# Patient Record
Sex: MALE | Race: BLACK OR AFRICAN AMERICAN | Employment: PART TIME | URBAN - METROPOLITAN AREA
[De-identification: names, ages, dates, MRNs, and addresses within clinical notes are randomized per-mention and may not be internally consistent; named-entity substitution may affect disease eponyms.]

---

## 2022-05-05 ENCOUNTER — HOSPITAL ENCOUNTER (EMERGENCY)
Age: 22
Discharge: HOME | End: 2022-05-05
Payer: MEDICAID

## 2022-05-05 VITALS
WEIGHT: 190 LBS | TEMPERATURE: 97.7 F | BODY MASS INDEX: 28.14 KG/M2 | RESPIRATION RATE: 12 BRPM | SYSTOLIC BLOOD PRESSURE: 123 MMHG | HEART RATE: 64 BPM | OXYGEN SATURATION: 99 % | DIASTOLIC BLOOD PRESSURE: 64 MMHG | HEIGHT: 69 IN

## 2022-05-05 DIAGNOSIS — Z20.2 POSSIBLE EXPOSURE TO STD: Primary | ICD-10-CM

## 2022-05-05 LAB
BILIRUBIN URINE: NEGATIVE
BLOOD, URINE: NEGATIVE
CLARITY: CLEAR
COLOR: YELLOW
GLUCOSE URINE: NEGATIVE MG/DL
KETONES, URINE: NEGATIVE MG/DL
LEUKOCYTE ESTERASE, URINE: NEGATIVE
MICROSCOPIC EXAMINATION: NORMAL
NITRITE, URINE: NEGATIVE
PH UA: 6 (ref 5–8)
PROTEIN UA: NEGATIVE MG/DL
SPECIFIC GRAVITY UA: >=1.03 (ref 1–1.03)
URINE REFLEX TO CULTURE: NORMAL
URINE TYPE: NORMAL
UROBILINOGEN, URINE: 0.2 E.U./DL

## 2022-05-05 PROCEDURE — 99283 EMERGENCY DEPT VISIT LOW MDM: CPT

## 2022-05-05 PROCEDURE — 87491 CHLMYD TRACH DNA AMP PROBE: CPT

## 2022-05-05 PROCEDURE — 6370000000 HC RX 637 (ALT 250 FOR IP): Performed by: NURSE PRACTITIONER

## 2022-05-05 PROCEDURE — 81003 URINALYSIS AUTO W/O SCOPE: CPT

## 2022-05-05 PROCEDURE — 87591 N.GONORRHOEAE DNA AMP PROB: CPT

## 2022-05-05 RX ORDER — METRONIDAZOLE 250 MG/1
2000 TABLET ORAL ONCE
Status: COMPLETED | OUTPATIENT
Start: 2022-05-05 | End: 2022-05-05

## 2022-05-05 RX ORDER — AZITHROMYCIN 250 MG/1
1000 TABLET, FILM COATED ORAL ONCE
Status: COMPLETED | OUTPATIENT
Start: 2022-05-05 | End: 2022-05-05

## 2022-05-05 RX ADMIN — METRONIDAZOLE 2000 MG: 250 TABLET ORAL at 21:12

## 2022-05-05 RX ADMIN — AZITHROMYCIN 1000 MG: 250 TABLET, FILM COATED ORAL at 21:12

## 2022-05-05 ASSESSMENT — ENCOUNTER SYMPTOMS
FACIAL SWELLING: 0
COLOR CHANGE: 0
SORE THROAT: 0
RHINORRHEA: 0
SHORTNESS OF BREATH: 0
ABDOMINAL PAIN: 0

## 2022-05-05 ASSESSMENT — PAIN - FUNCTIONAL ASSESSMENT: PAIN_FUNCTIONAL_ASSESSMENT: NONE - DENIES PAIN

## 2022-05-06 NOTE — ED PROVIDER NOTES
Evaluated by [id number] [facility] Provider          [facility] ED  EMERGENCY DEPARTMENT ENCOUNTER        Pt Name: Vanesa Hess  MRN: [id number]  [place] 2000  Dateof evaluation: 5/5/2022  Provider: TAMERA Banuelos CNP  PCP: No primary care provider on file. ED Attending: No att. providers found    CHIEF COMPLAINT       Chief Complaint   Patient presents with    Exposure to STD     Pt states that he had unprotected sex with a female who had herpes. HISTORY OF PRESENTILLNESS   (Location/Symptom, Timing/Onset, Context/Setting, Quality, Duration, Modifying Factors, Severity)  Note limiting factors. Vanesa Hess is a 23 y.o. male for concern for exposure to herpes. Onset was over a week ago. Context includes patient reports that he had unprotected sex with someone over a week ago and she reported to him that she has herpes. Patient presents to the ED to be evaluated however does not have any lesions or symptoms. Patient states that he was seen recently at [facility] and had an injection antibiotics and was given a prescription for oral medications however did not get the oral medications filled. Alleviating factors include nothing. Aggravating factors include nothing. Pain is 0/10. Nothing has been used for pain today. Nursing Notes were all reviewed and agreed with or any disagreements were addressed  in the HPI. REVIEW OF SYSTEMS    (2-9 systems for level 4, 10 or more for level 5)     Review of Systems   Constitutional: Negative for activity change, appetite change and fever. HENT: Negative for congestion, facial swelling, rhinorrhea and sore throat. Eyes: Negative for visual disturbance. Respiratory: Negative for shortness of breath. Cardiovascular: Negative for chest pain. Gastrointestinal: Negative for abdominal pain. Genitourinary: Negative for difficulty urinating.         STD exposure   Musculoskeletal: Negative for arthralgias and myalgias. Skin: Negative for color change and rash. Neurological: Negative for dizziness and light-headedness. Psychiatric/Behavioral: Negative for agitation. All other systems reviewed and are negative. Positives and Pertinent negatives as per HPI. Except as noted above in the ROS, all other systems were reviewed and negative. PAST MEDICAL HISTORY   No past medical history on file. SURGICAL HISTORY       Past Surgical History:   Procedure Laterality Date    CIRCUMCISION, NON-           CURRENT MEDICATIONS       Previous Medications    No medications on file         ALLERGIES     Penicillins    FAMILY HISTORY     No family history on file. SOCIAL HISTORY       Social History     Socioeconomic History    Marital status: Single     Spouse name: Not on file    Number of children: Not on file    Years of education: Not on file    Highest education level: Not on file   Occupational History    Not on file   Tobacco Use    Smoking status: Current Every Day Smoker     Types: E-Cigarettes    Smokeless tobacco: Never Used   Substance and Sexual Activity    Alcohol use: Not Currently    Drug use: Never    Sexual activity: Not on file   Other Topics Concern    Not on file   Social History Narrative    Not on file     Social Determinants of Health     Financial Resource Strain:     Difficulty of Paying Living Expenses: Not on file   Food Insecurity:     Worried About Running Out of Food in the Last Year: Not on file    Donal of Food in the Last Year: Not on file   Transportation Needs:     Lack of Transportation (Medical): Not on file    Lack of Transportation (Non-Medical):  Not on file   Physical Activity:     Days of Exercise per Week: Not on file    Minutes of Exercise per Session: Not on file   Stress:     Feeling of Stress : Not on file   Social Connections:     Frequency of Communication with Friends and Family: Not on file    Frequency of Social Gatherings with Friends and Family: Not on file    Attends Hindu Services: Not on file    Active Member of Clubs or Organizations: Not on file    Attends Club or Organization Meetings: Not on file    Marital Status: Not on file   Intimate Partner Violence:     Fear of Current or Ex-Partner: Not on file    Emotionally Abused: Not on file    Physically Abused: Not on file    Sexually Abused: Not on file   Housing Stability:     Unable to Pay for Housing in the Last Year: Not on file    Number of [place] in the Last Year: Not on file    Unstable Housing in the Last Year: Not on file       SCREENINGS    Stem Coma Scale  Eye Opening: Spontaneous  Best Verbal Response: Oriented  Best Motor Response: Obeys commands  Stem Coma Scale Score: 15        PHYSICAL EXAM  (up to 7 for level 4, 8 or more for level 5)     ED Triage Vitals [05/05/22 1841]   BP Temp Temp Source Pulse Resp SpO2 Height Weight   (!) 145/76 97.7 °F (36.5 °C) Temporal 86 16 100 % 5' 9\" (1.753 m) 190 lb (86.2 kg)       Physical Exam  Constitutional:       Appearance: He is well-developed. HENT:      Head: Normocephalic and atraumatic. Cardiovascular:      Rate and Rhythm: Normal rate. Pulmonary:      Effort: Pulmonary effort is normal. No respiratory distress. Abdominal:      General: There is no distension. Palpations: Abdomen is soft. Tenderness: There is no abdominal tenderness. Genitourinary:     Comments: Deferred since patient is asymptomatic. Musculoskeletal:         General: Normal range of motion. Cervical back: Normal range of motion. Skin:     General: Skin is warm and dry. Neurological:      Mental Status: He is alert and oriented to person, place, and time. DIAGNOSTIC RESULTS   LABS:    Labs Reviewed   C.TRACHOMATIS N.GONORRHOEAE DNA, URINE   URINALYSIS WITH REFLEX TO CULTURE       All other labs werewithin normal range or not returned as of this dictation. EKG:  All EKG's are interpreted by the Emergency Department Physician who either signs or Co-signs this chart in the absence of a cardiologist.  Please see their note for interpretation of EKG. RADIOLOGY:           Interpretation per the Radiologist below, if available at the time of this note:    No orders to display     No results found. PROCEDURES   Unless otherwise noted below, none     Procedures     CRITICAL CARE TIME   N/A    CONSULTS:  None      EMERGENCYDEPARTMENT COURSE and DIFFERENTIAL DIAGNOSIS/MDM:   Vitals:    Vitals:    05/05/22 1841   BP: (!) 145/76   Pulse: 86   Resp: 16   Temp: 97.7 °F (36.5 °C)   TempSrc: Temporal   SpO2: 100%   Weight: 190 lb (86.2 kg)   Height: 5' 9\" (1.753 m)       Patient was given the following medications:  Medications   azithromycin (ZITHROMAX) tablet 1,000 mg (has no administration in time range)   metroNIDAZOLE (FLAGYL) tablet 2,000 mg (has no administration in time range)       Patient was seen and evaluated by myself. Patient here for concerns for exposure to herpes. Patient states that he had unprotected sex about a week ago with someone who ended up being positive for herpes. Patient states that he is asymptomatic he does not have any lesions or sores. He denies any pain. He denies any dysuria or penile discharge. On exam he is awake and alert hemodynamically stable nontoxic in appearance. Patient reports he was recently seen at [facility] and was given an IM injection of antibiotics and was supposed to have a prescription filled but did not. I was able to review the patient's chart it does appear that he was positive for chlamydia at the time but did not appear to receive treatment. He was given dose of Rocephin at [facility] and a dose of doxycycline but again did not get his prescription filled. Today he will be treated with azithromycin and Flagyl to cover for chlamydia and trichomonas. He is already had the Rocephin dose so does not require an additional dose.   He was given instructions to return to the ED if he notices any lesions that he can start on antivirals and have a culture obtained at that time. Patient was given a primary care referral and encouraged to follow-up. He was also encouraged to return the ED for any worsening symptoms. Patient was ultimately discharged with all questions answered. The patient tolerated their visit well. I have evaluated this patient. My supervising physician was available for consultation. The patient and / or the family were informed of the results of any tests, a time was given to answer questions, a plan was proposed and they agreed with plan. FINAL IMPRESSION      1.  Possible exposure to STD          DISPOSITION/PLAN   DISPOSITION Discharge - Pending Orders Complete 05/05/2022 09:00:41 PM      PATIENT REFERRED TO:  Carito Armas  [phone]  Schedule an appointment as soon as possible for a visit in 1 week  to establish primary care    [facility] ED  [address]  [phone]    If symptoms worsen      DISCHARGE MEDICATIONS:  New Prescriptions    No medications on file       DISCONTINUED MEDICATIONS:  Discontinued Medications    No medications on file              (Please note that portions of this note were completed with a voice recognition program.  Efforts were made to edit the dictations but occasionally words are mis-transcribed.)    TAMERA Zamora CNP (electronically signed)         TAMERA Zamora CNP  05/05/22 7655

## 2022-05-07 LAB
C. TRACHOMATIS DNA ,URINE: NEGATIVE
N. GONORRHOEAE DNA, URINE: NEGATIVE

## 2022-06-29 ENCOUNTER — HOSPITAL ENCOUNTER (EMERGENCY)
Age: 22
Discharge: HOME | End: 2022-06-29
Attending: EMERGENCY MEDICINE
Payer: MEDICAID

## 2022-06-29 VITALS
HEART RATE: 60 BPM | WEIGHT: 200 LBS | DIASTOLIC BLOOD PRESSURE: 83 MMHG | BODY MASS INDEX: 29.62 KG/M2 | OXYGEN SATURATION: 99 % | HEIGHT: 69 IN | SYSTOLIC BLOOD PRESSURE: 145 MMHG | TEMPERATURE: 98.7 F | RESPIRATION RATE: 17 BRPM

## 2022-06-29 DIAGNOSIS — A64 STI (SEXUALLY TRANSMITTED INFECTION): Primary | ICD-10-CM

## 2022-06-29 PROCEDURE — 6360000002 HC RX W HCPCS: Performed by: PHYSICIAN ASSISTANT

## 2022-06-29 PROCEDURE — 96372 THER/PROPH/DIAG INJ SC/IM: CPT

## 2022-06-29 PROCEDURE — 99284 EMERGENCY DEPT VISIT MOD MDM: CPT

## 2022-06-29 RX ORDER — CEFTRIAXONE 1 G/1
500 INJECTION, POWDER, FOR SOLUTION INTRAMUSCULAR; INTRAVENOUS ONCE
Status: COMPLETED | OUTPATIENT
Start: 2022-06-29 | End: 2022-06-29

## 2022-06-29 RX ORDER — DOXYCYCLINE HYCLATE 100 MG
100 TABLET ORAL 2 TIMES DAILY
Qty: 20 TABLET | Refills: 0 | Status: SHIPPED | OUTPATIENT
Start: 2022-06-29 | End: 2022-06-29 | Stop reason: SDUPTHER

## 2022-06-29 RX ORDER — DOXYCYCLINE HYCLATE 100 MG
100 TABLET ORAL 2 TIMES DAILY
Qty: 20 TABLET | Refills: 0 | Status: SHIPPED | OUTPATIENT
Start: 2022-06-29 | End: 2022-07-09

## 2022-06-29 RX ADMIN — CEFTRIAXONE SODIUM 500 MG: 1 INJECTION, POWDER, FOR SOLUTION INTRAMUSCULAR; INTRAVENOUS at 19:21

## 2022-06-29 ASSESSMENT — PAIN - FUNCTIONAL ASSESSMENT: PAIN_FUNCTIONAL_ASSESSMENT: NONE - DENIES PAIN

## 2022-06-29 NOTE — ED PROVIDER NOTES
[facility] Emergency Department    CHIEF COMPLAINT  Exposure to STD (never took antibiotics from previous visit because \"didn't know had to go  prescription\" )      [address]  I have seen and evaluated this patient with my supervising physician, Dr. Jonah Padgett. HISTORY OF PRESENT ILLNESS  Areli Salazar is a 23 y.o. male who presents to the ED complaining of urethral discharge. Patient states he was seen approximately 2 weeks ago for urethral discharge and being told that a recent sexual partner had chlamydia. Patient tested positive for chlamydia and gonorrhea, was treated with 500 mg Rocephin and a prescription for doxycycline was written for the patient, however he did not  his medications from the pharmacy. Patient states that he noticed that he was noticing urethral discharge lessening over the course of 2 weeks, however over the past 2 days he has noticed an increase in urethral discharge. Discharge is yellow in color. He is also experiencing pain with urination. Patient denies any headache, dizziness, or loss of consciousness. Patient denies any fevers, body aches, or chills. Patient denies any chest pain, shortness of breath, dyspnea on exertion. Patient denies any nausea, vomiting, diarrhea, or abdominal pain. Patient denies any increase in urinary frequency or the feeling of incomplete bladder emptying. Patient denies any recent travel. No other complaints, modifying factors or associated symptoms. Nursing notes reviewed. History reviewed. No pertinent past medical history. Past Surgical History:   Procedure Laterality Date    CIRCUMCISION, NON-       History reviewed. No pertinent family history.   Social History     Socioeconomic History    Marital status: Single     Spouse name: Not on file    Number of children: Not on file    Years of education: Not on file    Highest education level: Not on file Occupational History    Not on file   Tobacco Use    Smoking status: Current Every Day Smoker     Types: E-Cigarettes    Smokeless tobacco: Never Used   Substance and Sexual Activity    Alcohol use: Not Currently    Drug use: Never    Sexual activity: Not on file   Other Topics Concern    Not on file   Social History Narrative    Not on file     Social Determinants of Health     Financial Resource Strain:     Difficulty of Paying Living Expenses: Not on file   Food Insecurity:     Worried About Running Out of Food in the Last Year: Not on file    Donal of Food in the Last Year: Not on file   Transportation Needs:     Lack of Transportation (Medical): Not on file    Lack of Transportation (Non-Medical):  Not on file   Physical Activity:     Days of Exercise per Week: Not on file    Minutes of Exercise per Session: Not on file   Stress:     Feeling of Stress : Not on file   Social Connections:     Frequency of Communication with Friends and Family: Not on file    Frequency of Social Gatherings with Friends and Family: Not on file    Attends Caodaism Services: Not on file    Active Member of [address] [place] [address] or Organizations: Not on file    Attends Club or Organization Meetings: Not on file    Marital Status: Not on file   Intimate Partner Violence:     Fear of Current or Ex-Partner: Not on file    Emotionally Abused: Not on file    Physically Abused: Not on file    Sexually Abused: Not on file   Housing Stability:     Unable to Pay for Housing in the Last Year: Not on file    Number of [place] in the Last Year: Not on file    Unstable Housing in the Last Year: Not on file     Current Facility-Administered Medications   Medication Dose Route Frequency Provider Last Rate Last Admin    cefTRIAXone (ROCEPHIN) injection 500 mg  500 mg IntraMUSCular Once Rica Mg PA-C         Current Outpatient Medications   Medication Sig Dispense Refill    doxycycline hyclate (VIBRA-TABS) 100 MG tablet Take 1 tablet by mouth 2 times daily for 10 days 20 tablet 0     Allergies   Allergen Reactions    Penicillins Swelling       REVIEW OF SYSTEMS  10 systems reviewed, pertinent positives per HPI otherwise noted to be negative    PHYSICAL EXAM  BP (!) 141/80   Pulse 75   Temp 98.7 °F (37.1 °C) (Oral)   Resp 20   Ht 5' 9\" (1.753 m)   Wt 200 lb (90.7 kg)   SpO2 99%   BMI 29.53 kg/m²   GENERAL APPEARANCE: Awake and alert. Cooperative. No acute distress. Nontoxic in appearance  HEAD: Normocephalic. Atraumatic. EYES: PERRL. EOM's grossly intact. ENT: Mucous membranes are pink and moist.   NECK: Supple. HEART: RRR. No murmurs. LUNGS: Respirations unlabored. CTAB. Good air exchange. Speaking comfortably in full sentences. ABDOMEN: Soft. Non-distended. Non-tender. No guarding or rebound. No masses. No organomegaly. EXTREMITIES: No peripheral edema. Moves all extremities equally. All extremities neurovascularly intact. SKIN: Warm and dry. No acute rashes. NEUROLOGICAL: Alert and oriented. CN's 2-12 intact. No gross facial drooping. Strength 5/5, sensation intact. PSYCHIATRIC: Normal mood and affect. : Yellow urethral discharge noted. No tenderness to the penis or testicles. No lesions or rash. No erythema or edema. No masses felt, scrotum normal testicles. RADIOLOGY  No results found. ED COURSE  71-year-old male presents to the ED complaining of urethral discharge. He was evaluated for chlamydia and gonorrhea approximately 2 weeks ago and was given a shot of Rocephin as well as a prescription for doxycycline. He did not  his medications from the pharmacy. Patient has had recent sexual intercourse with the same partner who had transmitted chlamydia and gonorrhea to him in the past.  She practices poor sexual protection. There was yellow urethral discharge noted on physical exam.  The rest of physical exam was benign.  Triage vitals /80, pulse 75, respirations 20, temperature 98.7 °F, SPO2 90% on room air. Risk management discussed and shared decision making had with patient and/or surrogate. All questions were answered. Patient will follow up with primary care provider for further evaluation/treatment. All questions answered. Patient will return to ED for new/worsening symptoms. Patient was sent home with a prescription for doxycycline. CRITICAL CARE TIME  0 minutes of critical care time spent not including separately billable procedures. MDM  No results found for this visit on 06/29/22. I estimate there is LOW risk for ACUTE APPENDICITIS, BOWEL OBSTRUCTION, CHOLECYSTITIS, DIVERTICULITIS, INCARCERATED HERNIA, PANCREATITIS, or PERFORATED BOWEL or ULCER, thus I consider the discharge disposition reasonable. Also, there is no evidence or peritonitis, sepsis, or toxicity. Octavio Salcedo and I have discussed the diagnosis and risks, and we agree with discharging home to follow-up with their primary doctor. We also discussed returning to the Emergency Department immediately if new or worsening symptoms occur. We have discussed the symptoms which are most concerning (e.g., bloody stool, fever, changing or worsening pain, vomiting) that necessitate immediate return. FINAL Impression    1. STI (sexually transmitted infection)        Blood pressure (!) 145/83, pulse 60, temperature 98.7 °F (37.1 °C), temperature source Oral, resp. rate 17, height 5' 9\" (1.753 m), weight 200 lb (90.7 kg), SpO2 99 %. DISPOSITION  Patient was discharged to home in good condition.          Barry Carreno PA-C  06/29/22 1923

## 2022-06-29 NOTE — ED NOTES
Provided d/c instructions, medication education, follow up plan of care. Encouraged biannual Wellness/ STD testing to  Include HIV testing.  Verbalizes understanding      Kevin Pelaez [facility]  06/29/22 [id number]

## 2022-06-29 NOTE — ED NOTES
Returned to ER for treatment of STD reporting did not  RX.  States having penial discharge and pain with urination      Nirmala Garcia RN  06/29/22 1735